# Patient Record
Sex: FEMALE | Race: WHITE | NOT HISPANIC OR LATINO | URBAN - METROPOLITAN AREA
[De-identification: names, ages, dates, MRNs, and addresses within clinical notes are randomized per-mention and may not be internally consistent; named-entity substitution may affect disease eponyms.]

---

## 2020-09-23 ENCOUNTER — OFFICE VISIT (OUTPATIENT)
Dept: URBAN - METROPOLITAN AREA CLINIC 68 | Facility: CLINIC | Age: 75
End: 2020-09-23

## 2020-10-01 ENCOUNTER — OFFICE VISIT (OUTPATIENT)
Dept: URBAN - METROPOLITAN AREA CLINIC 68 | Facility: CLINIC | Age: 75
End: 2020-10-01

## 2020-10-02 ENCOUNTER — TELEPHONE ENCOUNTER (OUTPATIENT)
Dept: URBAN - METROPOLITAN AREA CLINIC 68 | Facility: CLINIC | Age: 75
End: 2020-10-02

## 2020-10-05 ENCOUNTER — TELEPHONE ENCOUNTER (OUTPATIENT)
Dept: URBAN - METROPOLITAN AREA CLINIC 68 | Facility: CLINIC | Age: 75
End: 2020-10-05

## 2020-10-05 ENCOUNTER — LAB OUTSIDE AN ENCOUNTER (OUTPATIENT)
Dept: URBAN - METROPOLITAN AREA CLINIC 68 | Facility: CLINIC | Age: 75
End: 2020-10-05

## 2020-10-05 LAB
IMMUNOGLOBULIN A: (no result)
IMMUNOGLOBULIN A: (no result)
INTERPRETATION: (no result)
INTERPRETATION: (no result)
TISSUE TRANSGLUTAMINASE AB, IGA: (no result)
TISSUE TRANSGLUTAMINASE AB, IGA: (no result)

## 2020-10-13 ENCOUNTER — TELEPHONE ENCOUNTER (OUTPATIENT)
Dept: URBAN - METROPOLITAN AREA CLINIC 68 | Facility: CLINIC | Age: 75
End: 2020-10-13

## 2020-10-21 ENCOUNTER — OFFICE VISIT (OUTPATIENT)
Dept: URBAN - METROPOLITAN AREA CLINIC 68 | Facility: CLINIC | Age: 75
End: 2020-10-21

## 2022-06-04 ENCOUNTER — TELEPHONE ENCOUNTER (OUTPATIENT)
Dept: URBAN - METROPOLITAN AREA CLINIC 68 | Facility: CLINIC | Age: 77
End: 2022-06-04

## 2022-06-04 RX ORDER — CALCIUM CARBONATE 500(1250)
CALCIUM( 500MG ORAL 800 MG BID ) INACTIVE -HX ENTRY TABLET ORAL BID
OUTPATIENT
Start: 2014-04-15

## 2022-06-04 RX ORDER — LOVASTATIN 10 MG/1
LOVASTATIN( 10MG ORAL 1 DAILY ) INACTIVE -HX ENTRY TABLET ORAL DAILY
OUTPATIENT
Start: 2020-09-23

## 2022-06-04 RX ORDER — CALCIUM CARBONATE 500(1250)
CALCIUM( 500MG ORAL  DAILY ) INACTIVE -HX ENTRY TABLET ORAL DAILY
OUTPATIENT
Start: 2020-09-23

## 2022-06-04 RX ORDER — SODIUM PHOSPHATE, MONOBASIC, MONOHYDRATE, SODIUM PHOSPHATE, DIBASIC ANHYDROUS 1.102; .398 G/1; G/1
TABLET ORAL AS DIRECTED
Qty: 32 | Refills: 0 | OUTPATIENT
Start: 2020-01-06 | End: 2020-01-07

## 2022-06-04 RX ORDER — CHOLESTYRAMINE 4 G/9G
POWDER, FOR SUSPENSION ORAL
Qty: 1 | Refills: 1 | OUTPATIENT
Start: 2014-04-28 | End: 2016-06-03

## 2022-06-04 RX ORDER — OMEPRAZOLE 20 MG/1
OMEPRAZOLE( 20MG ORAL  DAILY ) INACTIVE -HX ENTRY TABLET, DELAYED RELEASE ORAL DAILY
OUTPATIENT
Start: 2017-06-19

## 2022-06-04 RX ORDER — UBIDECARENONE 200 MG
COQ10( 200MG ORAL  DAILY ) INACTIVE -HX ENTRY CAPSULE ORAL DAILY
OUTPATIENT
Start: 2014-04-15

## 2022-06-04 RX ORDER — DOXYLAMINE SUCCINATE 25 MG
UNISOM SLEEPGELS( 50MG ORAL  PRN ) INACTIVE -HX ENTRY TABLET ORAL PRN
OUTPATIENT
Start: 2014-05-07

## 2022-06-04 RX ORDER — UBIDECARENONE 200 MG
COQ10( 200MG ORAL  DAILY ) INACTIVE -HX ENTRY CAPSULE ORAL DAILY
OUTPATIENT
Start: 2016-06-03

## 2022-06-04 RX ORDER — FUROSEMIDE 20 MG/1
FUROSEMIDE( 20MG ORAL 10 MG 1 TO 2 TABS DAILY ) INACTIVE -HX ENTRY TABLET ORAL DAILY
OUTPATIENT
Start: 2014-04-15

## 2022-06-04 RX ORDER — EZETIMIBE 10 MG/1
ZETIA( 10MG ORAL 1 DAILY ) INACTIVE -HX ENTRY TABLET ORAL DAILY
OUTPATIENT
Start: 2020-09-23

## 2022-06-04 RX ORDER — CALCIUM CITRATE/VITAMIN D3 200MG-6.25
TABLET ORAL
Qty: 90 | Refills: 90 | OUTPATIENT
Start: 2014-05-07 | End: 2016-06-03

## 2022-06-04 RX ORDER — MULTIVITAMIN
MULTIPLE VITAMIN(  ORAL  DAILY ) INACTIVE -HX ENTRY TABLET ORAL DAILY
OUTPATIENT
Start: 2013-12-03

## 2022-06-04 RX ORDER — TRIAMTERENE/HYDROCHLOROTHIAZID 37.5-25 MG
DYAZIDE( 37.5-25MG ORAL  DAILY ) INACTIVE -HX ENTRY CAPSULE ORAL DAILY
OUTPATIENT
Start: 2018-05-10

## 2022-06-04 RX ORDER — SALICYLIC ACID 17 %
COMPOUND W MAXIMUM STRENGTH( 17% EXTERNAL  DAILY ) INACTIVE -HX ENTRY GEL (GRAM) TOPICAL DAILY
OUTPATIENT
Start: 2020-09-23

## 2022-06-04 RX ORDER — OLMESARTAN MEDOXOMIL-HYDROCHLOROTHIAZIDE 12.5; 2 MG/1; MG/1
BENICAR HCT( 20-12.5MG ORAL 10 MG DAILY ) INACTIVE -HX ENTRY TABLET, FILM COATED ORAL DAILY
OUTPATIENT
Start: 2014-04-15

## 2022-06-04 RX ORDER — EZETIMIBE 10 MG/1
ZETIA( 10MG ORAL  DAILY ) INACTIVE -HX ENTRY TABLET ORAL DAILY
OUTPATIENT
Start: 2016-06-03

## 2022-06-04 RX ORDER — SPIRONOLACTONE 50 MG/1
SPIRONOLACTONE( 50MG ORAL  DAILY ) INACTIVE -HX ENTRY TABLET, FILM COATED ORAL DAILY
OUTPATIENT
Start: 2020-09-23

## 2022-06-04 RX ORDER — CARVEDILOL 3.12 MG/1
CARVEDILOL( 3.125MG ORAL  DAILY ) INACTIVE -HX ENTRY TABLET, FILM COATED ORAL DAILY
OUTPATIENT
Start: 2018-05-10

## 2022-06-04 RX ORDER — BACLOFEN 10 MG/1
BACLOFEN( 10MG ORAL  DAY ) INACTIVE -HX ENTRY TABLET ORAL
OUTPATIENT
Start: 2017-06-19

## 2022-06-04 RX ORDER — TRIAMTERENE/HYDROCHLOROTHIAZID 37.5-25 MG
DYAZIDE( 37.5-25MG ORAL  DAILY ) INACTIVE -HX ENTRY CAPSULE ORAL DAILY
OUTPATIENT
Start: 2016-06-03

## 2022-06-04 RX ORDER — VALSARTAN 80 MG/1
DIOVAN( 80MG ORAL  BID ) INACTIVE -HX ENTRY TABLET ORAL BID
OUTPATIENT
Start: 2016-06-03

## 2022-06-04 RX ORDER — CARISOPRODOL 250 MG/1
CARISOPRODOL( 250MG ORAL HALF TAB PRN ) INACTIVE -HX ENTRY TABLET ORAL PRN
OUTPATIENT
Start: 2016-06-03

## 2022-06-04 RX ORDER — TRIAMTERENE AND HYDROCHLOROTHIAZIDE 37.5; 25 MG/1; MG/1
TRIAMTERENE-HCTZ( 37.5-25MG ORAL  DAILY ) INACTIVE -HX ENTRY TABLET ORAL DAILY
OUTPATIENT
Start: 2014-04-15

## 2022-06-04 RX ORDER — DIPHENHYDRAMINE HCL 2 %
BENADRYL ALLERGY( 25MG ORAL  DAILY ) INACTIVE -HX ENTRY CREAM (GRAM) TOPICAL DAILY
OUTPATIENT
Start: 2016-06-03

## 2022-06-04 RX ORDER — RIFAXIMIN 550 MG/1
TABLET ORAL
Qty: 20 | Refills: 0 | OUTPATIENT
Start: 2013-09-18 | End: 2013-09-28

## 2022-06-04 RX ORDER — POTASSIUM CHLORIDE 750 MG/1
POTASSIUM CHLORIDE ER( 10MEQ ORAL  DAILY ) INACTIVE -HX ENTRY TABLET, EXTENDED RELEASE ORAL DAILY
OUTPATIENT
Start: 2017-06-19

## 2022-06-04 RX ORDER — LUTEIN 6 MG
LUTEIN( 6MG ORAL  DAILY ) INACTIVE -HX ENTRY TABLET ORAL DAILY
OUTPATIENT
Start: 2016-06-03

## 2022-06-04 RX ORDER — ALPRAZOLAM 0.25 MG/1
ALPRAZOLAM( 0.25MG ORAL  DAILY ) INACTIVE -HX ENTRY TABLET, ORALLY DISINTEGRATING ORAL DAILY
OUTPATIENT
Start: 2017-06-19

## 2022-06-05 ENCOUNTER — TELEPHONE ENCOUNTER (OUTPATIENT)
Dept: URBAN - METROPOLITAN AREA CLINIC 68 | Facility: CLINIC | Age: 77
End: 2022-06-05

## 2022-06-05 RX ORDER — NEOMYCIN SULFATE 500 MG
TABLET ORAL
Qty: 40 | Refills: 40 | Status: ACTIVE | COMMUNITY
Start: 2020-10-01

## 2022-06-05 RX ORDER — CHOLESTYRAMINE 4 G/9G
POWDER, FOR SUSPENSION ORAL TWICE A DAY
Qty: 90 | Refills: 90 | Status: ACTIVE | COMMUNITY
Start: 2021-03-07

## 2022-06-05 RX ORDER — CARVEDILOL 6.25 MG/1
CARVEDILOL( 6.25MG ORAL 1 TWICE A DAY ) ACTIVE -HX ENTRY TABLET, FILM COATED ORAL TWICE A DAY
Status: ACTIVE | COMMUNITY
Start: 2020-10-21

## 2022-06-05 RX ORDER — AMLODIPINE BESYLATE 5 MG/1
NORVASC( 5MG ORAL  DAILY ) ACTIVE -HX ENTRY TABLET ORAL DAILY
Status: ACTIVE | COMMUNITY
Start: 2020-10-21

## 2022-06-05 RX ORDER — SPIRONOLACTONE 50 MG/1
SPIRONOLACTONE( 50MG ORAL 1 TWICE A DAY ) ACTIVE -HX ENTRY TABLET, FILM COATED ORAL TWICE A DAY
Status: ACTIVE | COMMUNITY
Start: 2020-10-21

## 2022-06-25 ENCOUNTER — TELEPHONE ENCOUNTER (OUTPATIENT)
Age: 77
End: 2022-06-25

## 2022-06-25 RX ORDER — BACLOFEN 10 MG/1
BACLOFEN( 10MG ORAL  DAY ) INACTIVE -HX ENTRY TABLET ORAL
OUTPATIENT
Start: 2017-06-19

## 2022-06-25 RX ORDER — SPIRONOLACTONE 50 MG/1
SPIRONOLACTONE( 50MG ORAL  DAILY ) INACTIVE -HX ENTRY TABLET, COATED ORAL DAILY
OUTPATIENT
Start: 2020-09-23

## 2022-06-25 RX ORDER — CARVEDILOL 3.12 MG/1
CARVEDILOL( 3.125MG ORAL  DAILY ) INACTIVE -HX ENTRY TABLET, FILM COATED ORAL DAILY
OUTPATIENT
Start: 2018-05-10

## 2022-06-25 RX ORDER — UBIDECARENONE 200 MG
COQ10( 200MG ORAL  DAILY ) INACTIVE -HX ENTRY CAPSULE ORAL DAILY
OUTPATIENT
Start: 2014-04-15

## 2022-06-25 RX ORDER — OMEPRAZOLE 20 MG/1
OMEPRAZOLE( 20MG ORAL  DAILY ) INACTIVE -HX ENTRY TABLET, DELAYED RELEASE ORAL DAILY
OUTPATIENT
Start: 2017-06-19

## 2022-06-25 RX ORDER — CHOLESTYRAMINE 4 G/9G
POWDER, FOR SUSPENSION ORAL
Qty: 1 | Refills: 1 | OUTPATIENT
Start: 2014-04-28 | End: 2016-06-03

## 2022-06-25 RX ORDER — POTASSIUM CHLORIDE 750 MG/1
POTASSIUM CHLORIDE ER( 10MEQ ORAL  DAILY ) INACTIVE -HX ENTRY TABLET, FILM COATED, EXTENDED RELEASE ORAL DAILY
OUTPATIENT
Start: 2017-06-19

## 2022-06-25 RX ORDER — VALSARTAN 80 MG
DIOVAN( 80MG ORAL  BID ) INACTIVE -HX ENTRY TABLET ORAL BID
OUTPATIENT
Start: 2016-06-03

## 2022-06-25 RX ORDER — CARISOPRODOL 250 MG/1
CARISOPRODOL( 250MG ORAL HALF TAB PRN ) INACTIVE -HX ENTRY TABLET ORAL PRN
OUTPATIENT
Start: 2016-06-03

## 2022-06-25 RX ORDER — ALPRAZOLAM 0.25 MG/1
ALPRAZOLAM( 0.25MG ORAL  DAILY ) INACTIVE -HX ENTRY TABLET, ORALLY DISINTEGRATING ORAL DAILY
OUTPATIENT
Start: 2017-06-19

## 2022-06-25 RX ORDER — OLMESARTAN MEDOXOMIL-HYDROCHLOROTHIAZIDE 12.5; 2 MG/1; MG/1
BENICAR HCT( 20-12.5MG ORAL 10 MG DAILY ) INACTIVE -HX ENTRY TABLET, FILM COATED ORAL DAILY
OUTPATIENT
Start: 2014-04-15

## 2022-06-25 RX ORDER — LUTEIN 6 MG
LUTEIN( 6MG ORAL  DAILY ) INACTIVE -HX ENTRY TABLET ORAL DAILY
OUTPATIENT
Start: 2016-06-03

## 2022-06-25 RX ORDER — FENUGREEK SEED/BL.THISTLE/ANIS 340 MG
PROBIOTICS(    DAILY ) INACTIVE -HX ENTRY CAPSULE ORAL DAILY
OUTPATIENT
Start: 2020-09-23

## 2022-06-25 RX ORDER — DOXYLAMINE SUCCINATE 25 MG
UNISOM SLEEPGELS( 50MG ORAL  PRN ) INACTIVE -HX ENTRY TABLET ORAL PRN
OUTPATIENT
Start: 2014-05-07

## 2022-06-25 RX ORDER — CALCIUM CARBONATE 500(1250)
CALCIUM( 500MG ORAL  DAILY ) INACTIVE -HX ENTRY TABLET ORAL DAILY
OUTPATIENT
Start: 2020-09-23

## 2022-06-25 RX ORDER — SALICYLIC ACID 17 %
COMPOUND W MAXIMUM STRENGTH( 17% EXTERNAL  DAILY ) INACTIVE -HX ENTRY GEL (GRAM) TOPICAL DAILY
OUTPATIENT
Start: 2020-09-23

## 2022-06-25 RX ORDER — DIPHENHYDRAMINE HYDROCHLORIDE 25 MG/1
BENADRYL( 25MG ORAL 2 AT BEDTIME ) INACTIVE -HX ENTRY TABLET, FILM COATED ORAL AT BEDTIME
OUTPATIENT
Start: 2018-05-10

## 2022-06-25 RX ORDER — SODIUM PHOSPHATE, MONOBASIC, MONOHYDRATE, SODIUM PHOSPHATE, DIBASIC ANHYDROUS 1.102; .398 G/1; G/1
TABLET ORAL AS DIRECTED
Qty: 32 | Refills: 0 | OUTPATIENT
Start: 2020-01-06 | End: 2020-01-07

## 2022-06-25 RX ORDER — FUROSEMIDE 20 MG/1
FUROSEMIDE( 20MG ORAL 10 MG 1 TO 2 TABS DAILY ) INACTIVE -HX ENTRY TABLET ORAL DAILY
OUTPATIENT
Start: 2014-04-15

## 2022-06-25 RX ORDER — TRIAMTERENE AND HYDROCHLOROTHIAZIDE 25; 37.5 MG/1; MG/1
TRIAMTERENE-HCTZ( 37.5-25MG ORAL  DAILY ) INACTIVE -HX ENTRY TABLET ORAL DAILY
OUTPATIENT
Start: 2014-04-15

## 2022-06-25 RX ORDER — PREDNISONE 10 MG/1
PREDNISONE( 2MG ORAL  DAILY ) INACTIVE -HX ENTRY TABLET ORAL DAILY
OUTPATIENT
Start: 2018-05-10

## 2022-06-25 RX ORDER — CALCIUM CITRATE/VITAMIN D3 200MG-6.25
TABLET ORAL
Qty: 90 | Refills: 90 | OUTPATIENT
Start: 2014-05-07 | End: 2016-06-03

## 2022-06-25 RX ORDER — LOVASTATIN 10 MG/1
LOVASTATIN( 10MG ORAL 1 DAILY ) INACTIVE -HX ENTRY TABLET ORAL DAILY
OUTPATIENT
Start: 2020-09-23

## 2022-06-25 RX ORDER — EZETIMIBE 10 MG/1
ZETIA( 10MG ORAL  DAILY ) INACTIVE -HX ENTRY TABLET ORAL DAILY
OUTPATIENT
Start: 2016-06-03

## 2022-06-25 RX ORDER — DIPHENHYDRAMINE HCL 2 %
BENADRYL ALLERGY( 25MG ORAL  DAILY ) INACTIVE -HX ENTRY CREAM (GRAM) TOPICAL DAILY
OUTPATIENT
Start: 2016-06-03

## 2022-06-25 RX ORDER — RIFAXIMIN 550 MG/1
TABLET ORAL
Qty: 20 | Refills: 0 | OUTPATIENT
Start: 2013-09-18 | End: 2013-09-28

## 2022-06-25 RX ORDER — CALCIUM CARBONATE 500(1250)
CALCIUM( 500MG ORAL 800 MG BID ) INACTIVE -HX ENTRY TABLET ORAL BID
OUTPATIENT
Start: 2014-04-15

## 2022-06-25 RX ORDER — UBIDECARENONE 200 MG
COQ10( 200MG ORAL  DAILY ) INACTIVE -HX ENTRY CAPSULE ORAL DAILY
OUTPATIENT
Start: 2016-06-03

## 2022-06-25 RX ORDER — EZETIMIBE 10 MG/1
ZETIA( 10MG ORAL 1 DAILY ) INACTIVE -HX ENTRY TABLET ORAL DAILY
OUTPATIENT
Start: 2020-09-23

## 2022-06-26 ENCOUNTER — TELEPHONE ENCOUNTER (OUTPATIENT)
Age: 77
End: 2022-06-26

## 2022-06-26 RX ORDER — NEOMYCIN SULFATE 500 MG
TABLET ORAL
Qty: 40 | Refills: 40 | Status: ACTIVE | COMMUNITY
Start: 2020-10-01

## 2022-06-26 RX ORDER — TRIAMTERENE AND HYDROCHLOROTHIAZIDE 37.5; 25 MG/1; MG/1
TRIAMTERENE/HCTZ( 37.5-25MG ORAL 1 DAILY ) ACTIVE -HX ENTRY CAPSULE ORAL DAILY
Status: ACTIVE | COMMUNITY
Start: 2020-10-21

## 2022-06-26 RX ORDER — SPIRONOLACTONE 50 MG/1
SPIRONOLACTONE( 50MG ORAL 1 TWICE A DAY ) ACTIVE -HX ENTRY TABLET, COATED ORAL TWICE A DAY
Status: ACTIVE | COMMUNITY
Start: 2020-10-21

## 2022-06-26 RX ORDER — CARVEDILOL 6.25 MG/1
CARVEDILOL( 6.25MG ORAL 1 TWICE A DAY ) ACTIVE -HX ENTRY TABLET, FILM COATED ORAL TWICE A DAY
Status: ACTIVE | COMMUNITY
Start: 2020-10-21

## 2022-06-26 RX ORDER — BIOTIN 10 MG
BIOTIN ULTRA STRENGTH( 10MG ORAL  DAILY ) ACTIVE -HX ENTRY TABLET ORAL DAILY
Status: ACTIVE | COMMUNITY
Start: 2020-10-21

## 2022-06-26 RX ORDER — UBIDECARENONE 200 MG
COQ-10( 200MG ORAL 1 DAILY ) ACTIVE -HX ENTRY CAPSULE ORAL DAILY
Status: ACTIVE | COMMUNITY
Start: 2020-10-21

## 2022-06-26 RX ORDER — ERGOCALCIFEROL 1.25 MG/1
VITAMIN D( 50000UNIT ORAL 1 WEEKLY ) ACTIVE -HX ENTRY CAPSULE ORAL WEEKLY
Status: ACTIVE | COMMUNITY
Start: 2020-10-21

## 2022-06-26 RX ORDER — CHOLESTYRAMINE 4 G/9G
POWDER, FOR SUSPENSION ORAL TWICE A DAY
Qty: 90 | Refills: 90 | Status: ACTIVE | COMMUNITY
Start: 2021-03-07

## 2023-06-19 ENCOUNTER — WEB ENCOUNTER (OUTPATIENT)
Dept: URBAN - METROPOLITAN AREA CLINIC 68 | Facility: CLINIC | Age: 78
End: 2023-06-19

## 2023-06-19 ENCOUNTER — LAB OUTSIDE AN ENCOUNTER (OUTPATIENT)
Dept: URBAN - METROPOLITAN AREA CLINIC 68 | Facility: CLINIC | Age: 78
End: 2023-06-19

## 2023-06-19 ENCOUNTER — OFFICE VISIT (OUTPATIENT)
Dept: URBAN - METROPOLITAN AREA CLINIC 68 | Facility: CLINIC | Age: 78
End: 2023-06-19
Payer: MEDICARE

## 2023-06-19 VITALS
DIASTOLIC BLOOD PRESSURE: 78 MMHG | BODY MASS INDEX: 22.36 KG/M2 | WEIGHT: 131 LBS | HEIGHT: 64 IN | SYSTOLIC BLOOD PRESSURE: 118 MMHG

## 2023-06-19 DIAGNOSIS — R19.4 CHANGE IN BOWEL HABITS: ICD-10-CM

## 2023-06-19 DIAGNOSIS — K64.8 HEMORRHOIDS, INTERNAL: ICD-10-CM

## 2023-06-19 DIAGNOSIS — Z86.010 HISTORY OF COLON POLYPS: ICD-10-CM

## 2023-06-19 DIAGNOSIS — N81.89 PELVIC FLOOR WEAKNESS: ICD-10-CM

## 2023-06-19 PROBLEM — 90458007: Status: ACTIVE | Noted: 2023-06-19

## 2023-06-19 PROBLEM — 428283002: Status: ACTIVE | Noted: 2023-06-19

## 2023-06-19 PROBLEM — 711263002: Status: ACTIVE | Noted: 2023-06-19

## 2023-06-19 PROBLEM — 88111009: Status: ACTIVE | Noted: 2023-06-19

## 2023-06-19 PROCEDURE — 99214 OFFICE O/P EST MOD 30 MIN: CPT | Performed by: INTERNAL MEDICINE

## 2023-06-19 RX ORDER — SPIRONOLACTONE 50 MG/1
SPIRONOLACTONE( 50MG ORAL 1 TWICE A DAY ) ACTIVE -HX ENTRY TABLET, COATED ORAL TWICE A DAY
Status: ACTIVE | COMMUNITY
Start: 2020-10-21

## 2023-06-19 RX ORDER — NEOMYCIN SULFATE 500 MG
TABLET ORAL
Qty: 40 | Refills: 40 | Status: ACTIVE | COMMUNITY
Start: 2020-10-01

## 2023-06-19 RX ORDER — CARVEDILOL 6.25 MG/1
CARVEDILOL( 6.25MG ORAL 1 TWICE A DAY ) ACTIVE -HX ENTRY TABLET, FILM COATED ORAL TWICE A DAY
Status: ACTIVE | COMMUNITY
Start: 2020-10-21

## 2023-06-19 RX ORDER — SOD SULF/POT CHLORIDE/MAG SULF 1.479 G
AS DIRECTED TABLET ORAL
OUTPATIENT
Start: 2023-06-19

## 2023-06-19 RX ORDER — BIOTIN 10 MG
BIOTIN ULTRA STRENGTH( 10MG ORAL  DAILY ) ACTIVE -HX ENTRY TABLET ORAL DAILY
Status: ACTIVE | COMMUNITY
Start: 2020-10-21

## 2023-06-19 RX ORDER — TRIAMTERENE AND HYDROCHLOROTHIAZIDE 37.5; 25 MG/1; MG/1
TRIAMTERENE/HCTZ( 37.5-25MG ORAL 1 DAILY ) ACTIVE -HX ENTRY CAPSULE ORAL DAILY
Status: ACTIVE | COMMUNITY
Start: 2020-10-21

## 2023-06-19 RX ORDER — CHOLESTYRAMINE 4 G/9G
POWDER, FOR SUSPENSION ORAL TWICE A DAY
Qty: 90 | Refills: 90 | Status: ACTIVE | COMMUNITY
Start: 2021-03-07

## 2023-06-19 RX ORDER — UBIDECARENONE 200 MG
COQ-10( 200MG ORAL 1 DAILY ) ACTIVE -HX ENTRY CAPSULE ORAL DAILY
Status: ACTIVE | COMMUNITY
Start: 2020-10-21

## 2023-06-19 RX ORDER — ERGOCALCIFEROL 1.25 MG/1
VITAMIN D( 50000UNIT ORAL 1 WEEKLY ) ACTIVE -HX ENTRY CAPSULE ORAL WEEKLY
Status: ACTIVE | COMMUNITY
Start: 2020-10-21

## 2023-06-19 NOTE — HPI-TODAY'S VISIT:
77 y.o. WF with a history of colon polyps and a history of partial colectomy in 2006 for an abscess who is here for follow up and a surveillance colonoscopy. She did have a colonoscopy in 1/2020 with Dr. Paulson which showed post-surgical anatomy and colon polyps. She did have Bartholin cyst surgery complicated in the past and developed a rectovaginal fistula. She describes having questionable hemorrhoids with some non-specific drainage. She has occasional loose stools. Since quitting her alcohol her psoriasis has improved. She describes having pelvic floor weakness and did have 2 episiotomies in the past. She is recommended to have an MRI defecography and MRI of rectum.

## 2023-07-14 ENCOUNTER — TELEPHONE ENCOUNTER (OUTPATIENT)
Dept: URBAN - METROPOLITAN AREA CLINIC 68 | Facility: CLINIC | Age: 78
End: 2023-07-14

## 2023-07-17 ENCOUNTER — TELEPHONE ENCOUNTER (OUTPATIENT)
Dept: URBAN - METROPOLITAN AREA CLINIC 63 | Facility: CLINIC | Age: 78
End: 2023-07-17

## 2023-08-18 ENCOUNTER — TELEPHONE ENCOUNTER (OUTPATIENT)
Dept: URBAN - METROPOLITAN AREA CLINIC 68 | Facility: CLINIC | Age: 78
End: 2023-08-18

## 2023-08-18 ENCOUNTER — LAB OUTSIDE AN ENCOUNTER (OUTPATIENT)
Dept: URBAN - METROPOLITAN AREA CLINIC 68 | Facility: CLINIC | Age: 78
End: 2023-08-18

## 2023-08-18 PROBLEM — 266435005: Status: ACTIVE | Noted: 2023-08-18

## 2023-08-21 ENCOUNTER — CLAIMS CREATED FROM THE CLAIM WINDOW (OUTPATIENT)
Dept: URBAN - METROPOLITAN AREA SURGERY CENTER 12 | Facility: SURGERY CENTER | Age: 78
End: 2023-08-21
Payer: MEDICARE

## 2023-08-21 ENCOUNTER — CLAIMS CREATED FROM THE CLAIM WINDOW (OUTPATIENT)
Dept: URBAN - METROPOLITAN AREA CLINIC 4 | Facility: CLINIC | Age: 78
End: 2023-08-21
Payer: MEDICARE

## 2023-08-21 DIAGNOSIS — Z86.010 HISTORY OF COLON POLYPS: ICD-10-CM

## 2023-08-21 DIAGNOSIS — Z86.010 PERSONAL HISTORY OF COLONIC POLYPS: ICD-10-CM

## 2023-08-21 DIAGNOSIS — K63.89 OTHER SPECIFIED DISEASES OF INTESTINE: ICD-10-CM

## 2023-08-21 DIAGNOSIS — K62.1 RECTAL POLYP: ICD-10-CM

## 2023-08-21 DIAGNOSIS — K63.5 POLYP OF ASCENDING COLON, UNSPECIFIED TYPE: ICD-10-CM

## 2023-08-21 DIAGNOSIS — K57.30 DIVERTICULOSIS OF LARGE INTESTINE WITHOUT PERFORATION OR ABSCESS WITHOUT BLEEDING: ICD-10-CM

## 2023-08-21 DIAGNOSIS — D12.3 BENIGN NEOPLASM OF TRANSVERSE COLON: ICD-10-CM

## 2023-08-21 DIAGNOSIS — Z12.11 COLON CANCER SCREENING (HIGH RISK): ICD-10-CM

## 2023-08-21 PROCEDURE — 00812 ANES LWR INTST SCR COLSC: CPT | Performed by: NURSE ANESTHETIST, CERTIFIED REGISTERED

## 2023-08-21 PROCEDURE — 45385 COLONOSCOPY W/LESION REMOVAL: CPT | Performed by: INTERNAL MEDICINE

## 2023-08-21 PROCEDURE — 88305 TISSUE EXAM BY PATHOLOGIST: CPT | Performed by: PATHOLOGY

## 2023-08-21 RX ORDER — NEOMYCIN SULFATE 500 MG
TABLET ORAL
Qty: 40 | Refills: 40 | Status: ACTIVE | COMMUNITY
Start: 2020-10-01

## 2023-08-21 RX ORDER — TRIAMTERENE AND HYDROCHLOROTHIAZIDE 37.5; 25 MG/1; MG/1
TRIAMTERENE/HCTZ( 37.5-25MG ORAL 1 DAILY ) ACTIVE -HX ENTRY CAPSULE ORAL DAILY
Status: ACTIVE | COMMUNITY
Start: 2020-10-21

## 2023-08-21 RX ORDER — SPIRONOLACTONE 50 MG/1
SPIRONOLACTONE( 50MG ORAL 1 TWICE A DAY ) ACTIVE -HX ENTRY TABLET, COATED ORAL TWICE A DAY
Status: ACTIVE | COMMUNITY
Start: 2020-10-21

## 2023-08-21 RX ORDER — CHOLESTYRAMINE 4 G/9G
POWDER, FOR SUSPENSION ORAL TWICE A DAY
Qty: 90 | Refills: 90 | Status: ACTIVE | COMMUNITY
Start: 2021-03-07

## 2023-08-21 RX ORDER — CARVEDILOL 6.25 MG/1
CARVEDILOL( 6.25MG ORAL 1 TWICE A DAY ) ACTIVE -HX ENTRY TABLET, FILM COATED ORAL TWICE A DAY
Status: ACTIVE | COMMUNITY
Start: 2020-10-21

## 2023-08-21 RX ORDER — SOD SULF/POT CHLORIDE/MAG SULF 1.479 G
AS DIRECTED TABLET ORAL
Status: ACTIVE | COMMUNITY
Start: 2023-06-19

## 2023-08-21 RX ORDER — UBIDECARENONE 200 MG
COQ-10( 200MG ORAL 1 DAILY ) ACTIVE -HX ENTRY CAPSULE ORAL DAILY
Status: ACTIVE | COMMUNITY
Start: 2020-10-21

## 2023-08-21 RX ORDER — ERGOCALCIFEROL 1.25 MG/1
VITAMIN D( 50000UNIT ORAL 1 WEEKLY ) ACTIVE -HX ENTRY CAPSULE ORAL WEEKLY
Status: ACTIVE | COMMUNITY
Start: 2020-10-21

## 2023-08-21 RX ORDER — BIOTIN 10 MG
BIOTIN ULTRA STRENGTH( 10MG ORAL  DAILY ) ACTIVE -HX ENTRY TABLET ORAL DAILY
Status: ACTIVE | COMMUNITY
Start: 2020-10-21

## 2023-08-22 ENCOUNTER — DASHBOARD ENCOUNTERS (OUTPATIENT)
Age: 78
End: 2023-08-22

## 2023-08-22 ENCOUNTER — OFFICE VISIT (OUTPATIENT)
Dept: URBAN - METROPOLITAN AREA CLINIC 68 | Facility: CLINIC | Age: 78
End: 2023-08-22
Payer: MEDICARE

## 2023-08-22 ENCOUNTER — TELEPHONE ENCOUNTER (OUTPATIENT)
Dept: URBAN - METROPOLITAN AREA CLINIC 68 | Facility: CLINIC | Age: 78
End: 2023-08-22

## 2023-08-22 VITALS
BODY MASS INDEX: 22.36 KG/M2 | HEIGHT: 64 IN | SYSTOLIC BLOOD PRESSURE: 146 MMHG | DIASTOLIC BLOOD PRESSURE: 82 MMHG | WEIGHT: 131 LBS

## 2023-08-22 DIAGNOSIS — K57.90 DIVERTICULOSIS: ICD-10-CM

## 2023-08-22 DIAGNOSIS — Z86.010 HISTORY OF COLON POLYPS: ICD-10-CM

## 2023-08-22 DIAGNOSIS — K62.5 RECTAL BLEEDING: ICD-10-CM

## 2023-08-22 PROBLEM — 428283002: Status: ACTIVE | Noted: 2023-08-22

## 2023-08-22 PROBLEM — 724538004: Status: ACTIVE | Noted: 2023-08-22

## 2023-08-22 PROBLEM — 397881000: Status: ACTIVE | Noted: 2023-08-22

## 2023-08-22 PROCEDURE — 99213 OFFICE O/P EST LOW 20 MIN: CPT | Performed by: INTERNAL MEDICINE

## 2023-08-22 RX ORDER — ERGOCALCIFEROL 1.25 MG/1
VITAMIN D( 50000UNIT ORAL 1 WEEKLY ) ACTIVE -HX ENTRY CAPSULE ORAL WEEKLY
Status: ACTIVE | COMMUNITY
Start: 2020-10-21

## 2023-08-22 RX ORDER — SPIRONOLACTONE 50 MG/1
SPIRONOLACTONE( 50MG ORAL 1 TWICE A DAY ) ACTIVE -HX ENTRY TABLET, COATED ORAL TWICE A DAY
Status: ACTIVE | COMMUNITY
Start: 2020-10-21

## 2023-08-22 RX ORDER — CARVEDILOL 6.25 MG/1
CARVEDILOL( 6.25MG ORAL 1 TWICE A DAY ) ACTIVE -HX ENTRY TABLET, FILM COATED ORAL TWICE A DAY
Status: ACTIVE | COMMUNITY
Start: 2020-10-21

## 2023-08-22 RX ORDER — SOD SULF/POT CHLORIDE/MAG SULF 1.479 G
AS DIRECTED TABLET ORAL
Status: ACTIVE | COMMUNITY
Start: 2023-06-19

## 2023-08-22 RX ORDER — UBIDECARENONE 200 MG
COQ-10( 200MG ORAL 1 DAILY ) ACTIVE -HX ENTRY CAPSULE ORAL DAILY
Status: ACTIVE | COMMUNITY
Start: 2020-10-21

## 2023-08-22 RX ORDER — TRIAMTERENE AND HYDROCHLOROTHIAZIDE 37.5; 25 MG/1; MG/1
TRIAMTERENE/HCTZ( 37.5-25MG ORAL 1 DAILY ) ACTIVE -HX ENTRY CAPSULE ORAL DAILY
Status: ACTIVE | COMMUNITY
Start: 2020-10-21

## 2023-08-22 RX ORDER — BIOTIN 10 MG
BIOTIN ULTRA STRENGTH( 10MG ORAL  DAILY ) ACTIVE -HX ENTRY TABLET ORAL DAILY
Status: ACTIVE | COMMUNITY
Start: 2020-10-21

## 2023-08-22 RX ORDER — NEOMYCIN SULFATE 500 MG
TABLET ORAL
Qty: 40 | Refills: 40 | Status: ACTIVE | COMMUNITY
Start: 2020-10-01

## 2023-08-22 RX ORDER — CHOLESTYRAMINE 4 G/9G
POWDER, FOR SUSPENSION ORAL TWICE A DAY
Qty: 90 | Refills: 90 | Status: ACTIVE | COMMUNITY
Start: 2021-03-07

## 2023-08-22 NOTE — HPI-TODAY'S VISIT:
77 y.o. WF with a personal history of colon polyps, diverticulosis, IH, surgical anastomosis who is here for episodes of diarrhea and rectal bleeding following her colonoscopy yesterday. She denies any significant abdominal pain, no n/v. She denies any further bleeding today. A rectal exam in the office showed no blood. She is recommended to follow a soft diet for 48 hours and advance diet slowly. She denies starting any blood thinners. She denies any CP, no SOB, no n/v.

## 2024-08-21 ENCOUNTER — OFFICE VISIT (OUTPATIENT)
Dept: URBAN - METROPOLITAN AREA CLINIC 68 | Facility: CLINIC | Age: 79
End: 2024-08-21
Payer: MEDICARE

## 2024-08-21 VITALS
WEIGHT: 122.02 LBS | HEIGHT: 64 IN | HEART RATE: 93 BPM | BODY MASS INDEX: 20.83 KG/M2 | SYSTOLIC BLOOD PRESSURE: 118 MMHG | DIASTOLIC BLOOD PRESSURE: 80 MMHG

## 2024-08-21 DIAGNOSIS — Z86.010 HISTORY OF COLON POLYPS: ICD-10-CM

## 2024-08-21 DIAGNOSIS — N81.89 PELVIC FLOOR WEAKNESS: ICD-10-CM

## 2024-08-21 DIAGNOSIS — Z98.890 STATUS POST SURGERY: ICD-10-CM

## 2024-08-21 DIAGNOSIS — K59.1 FUNCTIONAL DIARRHEA: ICD-10-CM

## 2024-08-21 PROBLEM — 161615003: Status: ACTIVE | Noted: 2024-08-21

## 2024-08-21 PROCEDURE — 99214 OFFICE O/P EST MOD 30 MIN: CPT | Performed by: INTERNAL MEDICINE

## 2024-08-21 RX ORDER — TRIAMTERENE AND HYDROCHLOROTHIAZIDE 37.5; 25 MG/1; MG/1
TRIAMTERENE/HCTZ( 37.5-25MG ORAL 1 DAILY ) ACTIVE -HX ENTRY CAPSULE ORAL DAILY
Status: DISCONTINUED | COMMUNITY
Start: 2020-10-21

## 2024-08-21 RX ORDER — CARVEDILOL 6.25 MG/1
CARVEDILOL( 6.25MG ORAL 1 TWICE A DAY ) ACTIVE -HX ENTRY TABLET, FILM COATED ORAL TWICE A DAY
Status: ACTIVE | COMMUNITY
Start: 2020-10-21

## 2024-08-21 RX ORDER — BIOTIN 10 MG
BIOTIN ULTRA STRENGTH( 10MG ORAL  DAILY ) ACTIVE -HX ENTRY TABLET ORAL DAILY
Status: DISCONTINUED | COMMUNITY
Start: 2020-10-21

## 2024-08-21 RX ORDER — FUROSEMIDE 20 MG/1
1 TABLET TABLET ORAL ONCE A DAY
Status: ACTIVE | COMMUNITY

## 2024-08-21 RX ORDER — SPIRONOLACTONE 50 MG/1
SPIRONOLACTONE( 50MG ORAL 1 TWICE A DAY ) ACTIVE -HX ENTRY TABLET, COATED ORAL TWICE A DAY
Status: ACTIVE | COMMUNITY
Start: 2020-10-21

## 2024-08-21 RX ORDER — SOD SULF/POT CHLORIDE/MAG SULF 1.479 G
AS DIRECTED TABLET ORAL
Status: DISCONTINUED | COMMUNITY
Start: 2023-06-19

## 2024-08-21 RX ORDER — UBIDECARENONE 200 MG
COQ-10( 200MG ORAL 1 DAILY ) ACTIVE -HX ENTRY CAPSULE ORAL DAILY
Status: ACTIVE | COMMUNITY
Start: 2020-10-21

## 2024-08-21 RX ORDER — ERGOCALCIFEROL 1.25 MG/1
VITAMIN D( 50000UNIT ORAL 1 WEEKLY ) ACTIVE -HX ENTRY CAPSULE ORAL WEEKLY
Status: DISCONTINUED | COMMUNITY
Start: 2020-10-21

## 2024-08-21 RX ORDER — CHOLESTYRAMINE 4 G/9G
POWDER, FOR SUSPENSION ORAL TWICE A DAY
Qty: 90 | Refills: 90 | Status: DISCONTINUED | COMMUNITY
Start: 2021-03-07

## 2024-08-21 RX ORDER — NEOMYCIN SULFATE 500 MG
TABLET ORAL
Qty: 40 | Refills: 40 | Status: ACTIVE | COMMUNITY
Start: 2020-10-01

## 2024-08-21 RX ORDER — COLESEVELAM HYDROCHLORIDE 625 MG/1
2 TABLETS WITH MEALS TABLET, COATED ORAL ONCE A DAY
Qty: 60 TABLET | Refills: 1 | OUTPATIENT
Start: 2024-08-21

## 2024-08-21 NOTE — PHYSICAL EXAM CONSTITUTIONAL:
Past Medical History:   Diagnosis Date    Cancer     SMALL CELL CARCINOMA WITH METS TO BRAIN, PANCREAS AND LIVER.    Cardiomyopathy     CHF (congestive heart failure)     COPD (chronic obstructive pulmonary disease)     Emphysema lung     Heart attack 05/03/2019    2nd heart attack 05/10/2019    Hyperlipidemia     Hypertension     Hypocalcemia 6/11/2019    Pleural effusion     Pneumonia     STEMI (ST elevation myocardial infarction) 5/3/2019       Past Surgical History:   Procedure Laterality Date    COLONOSCOPY  02/26/2015    History of colonic polps    CORONARY ANGIOGRAPHY N/A 5/12/2019    Procedure: ANGIOGRAM, CORONARY ARTERY;  Surgeon: Cordell Sal MD PhD;  Location: [facility] CATH LAB/EP;  Service: Cardiology;  Laterality: N/A;    HEMIARTHROPLASTY OF HIP Left 10/23/2020    Procedure: HEMIARTHROPLASTY, HIP;  Surgeon: Luca Feliciano MD;  Location: [facility] OR [address] [place] [address];  Service: Orthopedics;  Laterality: Left;    LEFT HEART CATHETERIZATION Left 5/12/2019    Procedure: CATHETERIZATION, HEART, LEFT;  Surgeon: Cordell Sal MD PhD;  Location: [facility] CATH LAB/07/25/22 
well developed, well nourished , in no acute distress , ambulating without difficulty , normal communication ability
Statement Selected

## 2024-08-21 NOTE — HPI-TODAY'S VISIT:
78 y.o. WF with a personal history of colon polyps, diverticulosis, IH, surgical anastomosis who is here for follow up. She denies any significant abdominal pain, no n/v.  She describes having some episodes of diarrhea. She has been taking Imodium up to 4 times a day. She used to take cholestyramine in the past and will restart Colesevelam 2 tabs/d and use Imodium prn.  She has occasional mucus in her stools. She is s/p a colonoscopy in 2023 which showed colon polyps, surgical anastomosis, diverticulosis and IH.  She denies any rectal bleeding. She does have some urinary and fecal incontinence and is recommended to have pelvic floor therapy.